# Patient Record
Sex: FEMALE | Race: OTHER | HISPANIC OR LATINO | Employment: UNEMPLOYED | URBAN - METROPOLITAN AREA
[De-identification: names, ages, dates, MRNs, and addresses within clinical notes are randomized per-mention and may not be internally consistent; named-entity substitution may affect disease eponyms.]

---

## 2024-11-12 ENCOUNTER — HOSPITAL ENCOUNTER (OUTPATIENT)
Facility: MEDICAL CENTER | Age: 34
End: 2024-11-12
Payer: COMMERCIAL

## 2024-11-12 ENCOUNTER — OFFICE VISIT (OUTPATIENT)
Dept: URGENT CARE | Facility: CLINIC | Age: 34
End: 2024-11-12
Payer: COMMERCIAL

## 2024-11-12 VITALS
BODY MASS INDEX: 31.07 KG/M2 | RESPIRATION RATE: 16 BRPM | TEMPERATURE: 98.4 F | OXYGEN SATURATION: 97 % | DIASTOLIC BLOOD PRESSURE: 70 MMHG | HEIGHT: 64 IN | SYSTOLIC BLOOD PRESSURE: 120 MMHG | HEART RATE: 82 BPM | WEIGHT: 182 LBS

## 2024-11-12 DIAGNOSIS — J02.9 PHARYNGITIS, UNSPECIFIED ETIOLOGY: ICD-10-CM

## 2024-11-12 LAB — S PYO DNA SPEC NAA+PROBE: NOT DETECTED

## 2024-11-12 PROCEDURE — 99203 OFFICE O/P NEW LOW 30 MIN: CPT

## 2024-11-12 PROCEDURE — 3074F SYST BP LT 130 MM HG: CPT

## 2024-11-12 PROCEDURE — 87070 CULTURE OTHR SPECIMN AEROBIC: CPT

## 2024-11-12 PROCEDURE — 3078F DIAST BP <80 MM HG: CPT

## 2024-11-12 PROCEDURE — 87651 STREP A DNA AMP PROBE: CPT

## 2024-11-12 RX ORDER — LIDOCAINE HYDROCHLORIDE 20 MG/ML
15 SOLUTION OROPHARYNGEAL 3 TIMES DAILY PRN
Qty: 100 ML | Refills: 0 | Status: SHIPPED | OUTPATIENT
Start: 2024-11-12 | End: 2024-11-17

## 2024-11-12 RX ORDER — DEXAMETHASONE SODIUM PHOSPHATE 10 MG/ML
10 INJECTION INTRAMUSCULAR; INTRAVENOUS ONCE
Status: COMPLETED | OUTPATIENT
Start: 2024-11-12 | End: 2024-11-12

## 2024-11-12 RX ORDER — ACETAMINOPHEN AND CODEINE PHOSPHATE 120; 12 MG/5ML; MG/5ML
1 SOLUTION ORAL DAILY
COMMUNITY
Start: 2024-10-30

## 2024-11-12 RX ADMIN — DEXAMETHASONE SODIUM PHOSPHATE 10 MG: 10 INJECTION INTRAMUSCULAR; INTRAVENOUS at 19:33

## 2024-11-12 ASSESSMENT — VISUAL ACUITY: OU: 1

## 2024-11-13 DIAGNOSIS — J02.9 PHARYNGITIS, UNSPECIFIED ETIOLOGY: ICD-10-CM

## 2024-11-13 NOTE — PROGRESS NOTES
Verbal consent was acquired by the patient to use [employer] ambient listening note generation during this visit     Date: 11/12/24        Chief Complaint   Patient presents with    Pharyngitis     X 4 days          History of Present Illness  The patient is a 34-year-old female who presents to urgent care for evaluation of a sore throat. She is accompanied by an adult male.    She reports experiencing a sore throat, headache, and body aches that began 4 days ago. She occasionally experiences difficulty swallowing and has significant congestion and phlegm in her throat. She does not report any difficulty breathing or shortness of breath. She has been taking ibuprofen and Tylenol, but these have not provided much relief.    She was diagnosed with strep throat 5 days ago and was prescribed azithromycin, which she took for 5 days. She has no known allergies to amoxicillin or penicillin. She reports no fevers.         ROS:    No severe shortness of breath   No Cardiac like chest pain, as discussed   As otherwise stated in HPI    Medical/SX/ Social History:  Reviewed per chart    Pertinent Medications:    Current Outpatient Medications on File Prior to Visit   Medication Sig Dispense Refill    norethindrone (MICRONOR) 0.35 MG tablet Take 1 Tablet by mouth every day.       No current facility-administered medications on file prior to visit.        Allergies:    Vancomycin     Problem list, medications, and allergies reviewed by myself today in Epic     Physical Exam:    Vitals:    11/12/24 1858   BP: 120/70   Pulse: 82   Resp: 16   Temp: 36.9 °C (98.4 °F)   SpO2: 97%             Physical Exam  Vitals reviewed.   Constitutional:       General: She is not in acute distress.     Appearance: Normal appearance. She is normal weight. She is not ill-appearing or toxic-appearing.   HENT:      Head: Normocephalic.      Right Ear: Tympanic membrane, ear canal and external ear normal.      Left Ear: Tympanic membrane, ear canal and  external ear normal.      Nose: No congestion or rhinorrhea.      Mouth/Throat:      Mouth: Mucous membranes are moist.      Dentition: Normal dentition.      Tongue: No lesions. Tongue does not deviate from midline.      Palate: No mass and lesions.      Pharynx: Posterior oropharyngeal erythema and postnasal drip present. No pharyngeal swelling, oropharyngeal exudate or uvula swelling.      Tonsils: No tonsillar exudate or tonsillar abscesses.   Eyes:      General: Lids are normal. Lids are everted, no foreign bodies appreciated. Vision grossly intact. Gaze aligned appropriately.      Extraocular Movements: Extraocular movements intact.      Conjunctiva/sclera:      Right eye: Right conjunctiva is injected. No chemosis, exudate or hemorrhage.     Left eye: Left conjunctiva is injected. No chemosis, exudate or hemorrhage.     Pupils: Pupils are equal, round, and reactive to light.   Cardiovascular:      Rate and Rhythm: Normal rate and regular rhythm.      Pulses: Normal pulses.      Heart sounds: Normal heart sounds.   Pulmonary:      Effort: Pulmonary effort is normal.      Breath sounds: Normal breath sounds.   Musculoskeletal:      Cervical back: Full passive range of motion without pain, normal range of motion and neck supple.   Neurological:      Mental Status: She is alert.        Diagnostics:    Results for orders placed or performed in visit on 11/12/24   POCT CEPHEID GROUP A STREP - PCR    Collection Time: 11/12/24  7:28 PM   Result Value Ref Range    POC Group A Strep, PCR Not Detected Not Detected, Invalid       Medical Decision making and plan :  I personally reviewed prior external notes and test results pertinent to today's visit. Pt is clinically stable at today's acute urgent care visit.  Patient appears nontoxic with no acute distress noted. Appropriate for outpatient care at this time.    Pleasant 34 y.o. female presented clinic with:     Assessment & Plan  1. Sore throat.  The patient reports a  sore throat, headache, and body aches that began 4 days ago. There is no fever or difficulty breathing, but swallowing is sometimes painful. Examination reveals redness at the back of the throat.  No red flag physical exam findings.  POCT group A strep negative.  Patient called and made aware of negative finding.  Due to her close exposure to her , who was recently diagnosed with strep pharyngitis, throat culture will be ordered.        Per the [place] trial I will administer 10 mg of dexamethasone in clinic for pharyngitis.  I discussed the risks vs benefits as well as alternatives with the patient and using shared decision making we have elected this as a treatment modality.       Supportive care measures such as nasal saline rinses, nasal spray, chest rub, warm steamy showers, and warm salt water gargles are recommended. Over-the-counter medications should be avoided due to potential interference with breast milk production. Robitussin, NyQuil without decongestant, and honey are acceptable alternatives. Hydration is crucial, and Tylenol or ibuprofen can be used for severe body aches.  Lidocaine has been prescribed and sent to the pharmacy.        Shared decision-making was utilized with patient for treatment plan. Medication discussed included indication for use and the potential benefits and side effects. Education was provided regarding the aforementioned assessments.  Differential Diagnosis, natural history, and supportive care discussed. All of the patient's questions were answered to their satisfaction at the time of discharge. Patient was encouraged to monitor symptoms closely. Those signs and symptoms which would warrant concern and mandate seeking a higher level of service through the emergency department discussed at length.  Patient stated agreement and understanding of this plan of care.    Disposition:  Home in stable condition     Voice Recognition Disclaimer:  Portions of this document were  created using voice recognition software and [employer] technology provided by [employer]. The software does have a chance of producing errors of grammar and possibly content. I have made every reasonable attempt to correct obvious errors, but there may be errors of grammar and possibly content that I did not discover before finalizing the  documentation.    RATNA Arriaza.

## 2024-11-15 LAB
BACTERIA SPEC RESP CULT: NORMAL
SIGNIFICANT IND 70042: NORMAL
SITE SITE: NORMAL
SOURCE SOURCE: NORMAL